# Patient Record
Sex: FEMALE | Race: WHITE | NOT HISPANIC OR LATINO | Employment: OTHER | URBAN - NONMETROPOLITAN AREA
[De-identification: names, ages, dates, MRNs, and addresses within clinical notes are randomized per-mention and may not be internally consistent; named-entity substitution may affect disease eponyms.]

---

## 2023-12-01 ENCOUNTER — OFFICE VISIT (OUTPATIENT)
Dept: URGENT CARE | Facility: CLINIC | Age: 78
End: 2023-12-01
Payer: MEDICARE

## 2023-12-01 VITALS
OXYGEN SATURATION: 95 % | RESPIRATION RATE: 15 BRPM | WEIGHT: 230 LBS | TEMPERATURE: 97.7 F | DIASTOLIC BLOOD PRESSURE: 84 MMHG | BODY MASS INDEX: 40.75 KG/M2 | HEART RATE: 115 BPM | SYSTOLIC BLOOD PRESSURE: 141 MMHG | HEIGHT: 63 IN

## 2023-12-01 DIAGNOSIS — J06.9 VIRAL URI WITH COUGH: Primary | ICD-10-CM

## 2023-12-01 PROCEDURE — 99213 OFFICE O/P EST LOW 20 MIN: CPT | Mod: 25 | Performed by: NURSE PRACTITIONER

## 2023-12-01 RX ORDER — LISINOPRIL AND HYDROCHLOROTHIAZIDE 12.5; 2 MG/1; MG/1
1 TABLET ORAL DAILY
COMMUNITY

## 2023-12-01 RX ORDER — BENZONATATE 100 MG/1
100-200 CAPSULE ORAL EVERY 8 HOURS PRN
Qty: 60 CAPSULE | Refills: 0 | Status: SHIPPED | OUTPATIENT
Start: 2023-12-01 | End: 2023-12-11

## 2023-12-01 RX ORDER — AZITHROMYCIN 250 MG/1
TABLET, FILM COATED ORAL
Qty: 6 TABLET | Refills: 0 | Status: SHIPPED | OUTPATIENT
Start: 2023-12-01

## 2023-12-01 RX ORDER — FAMOTIDINE 20 MG/1
20 TABLET, FILM COATED ORAL
COMMUNITY
Start: 2023-02-08 | End: 2024-02-08

## 2023-12-01 RX ORDER — ATENOLOL 25 MG/1
25 TABLET ORAL
COMMUNITY
Start: 2023-02-08 | End: 2024-02-03

## 2023-12-01 RX ORDER — LOVASTATIN 40 MG/1
40 TABLET ORAL
COMMUNITY
Start: 2023-02-08 | End: 2024-02-03

## 2023-12-01 NOTE — PROGRESS NOTES
[facility]  Felecia MIKE Guerrero, APRN-CNP     Visit Note - 12/1/2023 2:48 PM   This note was generated with voice recognition software and may contain errors including spelling, grammar, syntax, and misrecognization of what was dictated.    Patient: Alesha Maya, MRN: [id number], 78 y.o., female   PCP: No primary care provider on file.  ------------------------------------  ALLERGIES: No Known Allergies     CURRENT MEDICATIONS:   Current Outpatient Medications   Medication Instructions    atenolol (TENORMIN) 25 mg, oral, Daily RT    azithromycin (Zithromax Z-Ayan) 250 mg tablet Take 2 tablets by mouth at once on day 1, then 1 tablet once a day on days 2-5. Take with a meal.    benzonatate (TESSALON) 100-200 mg, oral, Every 8 hours PRN, Do not crush or chew.    famotidine (PEPCID) 20 mg, oral, Daily RT    lisinopriL-hydrochlorothiazide 20-12.5 mg tablet 1 tablet, oral, Daily    lovastatin (MEVACOR) 40 mg, oral, Daily RT    rivaroxaban (XARELTO) 20 mg, oral, Daily RT     ------------------------------------  PAST MEDICAL HX:  History of HTN, GERD, DVT, and hyperlipidemia.   SURGICAL HX:  History reviewed. No pertinent surgical history.   FAMILY HX:   No pertinent history.   SOCIAL HX:    reports that she has never smoked. She has never used smokeless tobacco. . Has great grandkids.   ------------------------------------  CHIEF COMPLAINT:   Chief Complaint   Patient presents with    URI     RUNNY NOSE, COUGH, SORE THROAT X 2 DAYS      HISTORY OF PRESENT ILLNESS: The history was obtained from patient and her . Alesha is a 78 y.o. female, who presents with a chief complaint of a runny nose (clear mucus), sore throat, and dry cough x 3 days. Denies any fever/chills, body aches, ear pain, headaches, abdominal pain, chest pain, wheezing/shortness of breath, rashes, urinary symptoms, nausea/vomiting, and diarrhea. Denies any lightheadedness or dizziness; no changes in mental status. No swelling in  "legs. Appetite is decreased ; is able to eat and drink fluids without difficulty; denies loss of sense of taste or smell. Reports symptoms are unchanged since onset- reports she really doesn't feel too bad, but her kids were concerned, so they encouraged her to come to be seen. Has not taken any over-the-counter medications or home remedies for symptom management due to concerns about potential medication interactions and her stage 3 CKD.  Her great granddaughter and several other family members have been sick recently with similar sxs; no other known ill contacts. Has not received the COVID vaccine. No known history of COVID infection. . Is not a smoker.     REVIEW OF SYSTEMS:  10 systems reviewed negative with exception of history of present illness as listed above.    TODAY'S VITALS: /84   Pulse (!) 115   Temp 36.5 °C (97.7 °F)   Resp 15   Ht 1.6 m (5' 3\")   Wt 104 kg (230 lb)   SpO2 95%   BMI 40.74 kg/m²   Recheck HR: 103 - patient states is very nervous when she comes to the doctor's.   PHYSICAL EXAMINATION:  General:  Mildly ill-appearing, well nourished older female; alert and oriented; in no acute distress. Sitting comfortably on exam table. Non-dyspneic. Accompanied by her partner.   Eyes:  Pupils equal, round and reactive to light. No conjunctival erythema; no scleral icterus.   HENT: No frontal or maxillary sinus tenderness; + audible nasal congestion. Airway patent, TMs and ear canals clear/unremarkable bilaterally. Nasal mucosa mildly injected and edematous. Oral mucosa moist. Posterior pharynx mildly injected but without vesicles or oropharyngeal exudate aside from PND. Uvula is midline. Managing oral secretions without difficulty.  Neck:  Supple. No palpable lymphadenopathy. Trachea is midline.  Respiratory:  Respirations easy and unlabored, Breath sounds equal. Lungs are clear to auscultation; no wheezes, rhonchi, or rales; has good air movement throughout. + harsh, semi-productive cough " "noted. Non-dyspneic with ambulation; able to maintain SpO2.  Cardiovascular:  Normal rate, Regular rhythm. Normal S1S2. No m/r/g. No peripheral edema.   Gastrointestinal:  Soft, non-tender, non-distended; no palpable masses or organomegaly. Bowel sounds normoactive.  Musculoskeletal:  Grossly normal; appropriate for age.     Integumentary:  Pink, warm, dry, and intact. No rashes or skin discoloration appreciated. Good skin turgor.  Neurologic:  Alert and oriented, no gross deficits.    Cognition and Speech:  Oriented, Speech clear and coherent.    Psychiatric:  Cooperative, Appropriate mood & affect.       ------------------------------------  Medical Decision Making  LABORATORY or RADIOLOGICAL IMAGING ORDERS/RESULTS:   None    IMPRESSION/PLAN:  Course: Worsening; stable    1. Viral URI with cough  - azithromycin (Zithromax Z-Ayan) 250 mg tablet; Take 2 tablets by mouth at once on day 1, then 1 tablet once a day on days 2-5. Take with a meal.  Dispense: 6 tablet; Refill: 0  - benzonatate (Tessalon) 100 mg capsule; Take 1-2 capsules (100-200 mg) by mouth every 8 hours if needed for cough for up to 10 days. Do not crush or chew.  Dispense: 60 capsule; Refill: 0    No red flags on exam today. I have reviewed the  COVID-19 algorithm, and counseled pt on COVID-19 current recommendations. Symptoms consistent with viral URI with associated symptoms, but reviewed other potential etiologies. She declines testing for COVID today, but encouraged precautionary measures and to consider home testing. No antibiotics indicated at this point, but rx for \"watch and wait\" antibiotic (Zithromax) provided, per pt's request, with instructions to begin only if COVID test is negative AND if symptoms not improving over the next 5 days. In the meantime, encouraged conservative measures- instructed to push fluids, rest, and to use appropriate over the counter medications as needed for management of symptoms- saline nasal spray, salt water " gargles, vaporizer may be helpful. Will start Benzonatate for PRN use. Reviewed instructions for self-isolation and continued monitoring. Reviewed red flags to monitor for, counseled on potential adverse reactions of treatments, expectations for improvement in sxs, and advised to follow-up with primary care provider in 3-5 days if symptoms persist, or to seek care sooner if worsening or if any additional concerns/red flags develop.  Patient agreed with plan of care; questions were encouraged and answered.       SANTINO Hodgson-CNP   Advanced Practice Provider  [facility]